# Patient Record
(demographics unavailable — no encounter records)

---

## 2025-03-10 NOTE — HISTORY OF PRESENT ILLNESS
[FreeTextEntry1] : 22 year old M  with no past medical hx  here for SLE He reports he had an +JULIAN checked due to elevated LFTs - in 9/2024, he had elevated LFTs then in 12/2024 they were normal but as part of the work up he was checked for JULIAN and that was 1:40 Patient denies joint pains, joint swelling, joint erythema/warmth, morning stiffness, fatigue, fever, chills, weight loss, nasopharyngeal ulcers, chest pain, abdominal pain, , cough, SOB, nausea, vomiting, diarrhea, constipation, blood in stool, dysuria, hematuria, rash, photosensitivity, Raynaud's, alopecia, dry eyes, dry mouth, headaches, eye pain/redness, vision changes, myalgias, muscle weakness, dysphagia, numbness/tingling, , Hx of DVT/PEs.   12/2024 JULIAN 1:40 neg dsDNA, Martínez, RNP, Scl 70, SSA, SSB PMHx: As above PSHx: appendectomy  Family Hx: aunt has SLE Social Hx:  Smoking Hx: denies EtOH Hx: social Drug use: denies Occupation:   not , no children

## 2025-03-10 NOTE — ASSESSMENT
[FreeTextEntry1] : 22 year old M  with no past medical hx  here for SLE He reports he had an +JULIAN checked due to elevated LFTs - in 9/2024, he had elevated LFTs then in 12/2024 they were normal but as part of the work up he was checked for JULIAN and that was 1:40  Patient does not have signs of underlying connective tissue diseases (CTDs) including inflammatory joint pain, malar rash, photosensitivity, sicca symptoms,  Exam without synovitis, rashes, changes of Raynauds. JULIAN is a marker that is sensitive but not specific for underlying rheumatologic diseases such as lupus. The most common causes of positive JULIAN in patients without underlying rheumatologic diseases are infections, malignancies, and other autoimmune diseases such as Hashimotos. Up to 30% of patients without any underlying disease can have positive JULIAN. Moreover, an JULIAN for 1:40 is not considered significant per clinical guidelines His sub serologies are normal.  There is now suspicion for underlying CTD.    Total time spent in review of patient history, clinical exam, management, counseling, and plan of care:  47min

## 2025-03-10 NOTE — PHYSICAL EXAM
[TextEntry] :   GENERAL: Appears in no acute distress HEENT: EOMI. No conjunctival erythema. Moist mucous membranes. No nasopharyngeal ulcers NECK: Supple, no cervical lymphadenopathy CARDIOVASCULAR: RRR. S1, S2 auscultated. PULMONARY: Clear to auscultation b/l,  ABDOMINAL: Soft, nontender, nondistended.. MSK: No active synovitis, swelling, erythema, or warmth. No joint tenderness to palpation. No Bouchards or Heberdens nodles. No deformities.. Normal ROM of neck, back, b/l upper and lower extremities. No dactylitis, enthesitis, nail pitting SKIN: No lesions or rashes No sclerodactyly, telangiectasias, calcinosis NEURO: No focal deficits. Motor strength 5/5 in major muscle groups of b/l UE and LE. PSYCH: AAOx3. Normal affect and thought process.